# Patient Record
(demographics unavailable — no encounter records)

---

## 2024-10-22 NOTE — ASSESSMENT
[Patient Optimized for Surgery] : Patient optimized for surgery [No Further Testing Recommended] : no further testing recommended [Modify medications prior to procedure] : Modify medications prior to procedure [FreeTextEntry4] : Patient is medically cleared for surgery.  [FreeTextEntry7] : STOP Ozempic 7-10 days prior to procedure. Hold Metformin and Qsymia the morning of procedure.

## 2024-10-22 NOTE — HISTORY OF PRESENT ILLNESS
[No Pertinent Cardiac History] : no history of aortic stenosis, atrial fibrillation, coronary artery disease, recent myocardial infarction, or implantable device/pacemaker [No Pertinent Pulmonary History] : no history of asthma, COPD, sleep apnea, or smoking [No Adverse Anesthesia Reaction] : no adverse anesthesia reaction in self or family member [Chronic Anticoagulation] : no chronic anticoagulation [Chronic Kidney Disease] : no chronic kidney disease [Diabetes] : diabetes [(Patient denies any chest pain, claudication, dyspnea on exertion, orthopnea, palpitations or syncope)] : Patient denies any chest pain, claudication, dyspnea on exertion, orthopnea, palpitations or syncope [FreeTextEntry1] : varicose vein  [FreeTextEntry2] : 10/25/2024 [FreeTextEntry3] : Dr. Aliyah Neri  [FreeTextEntry4] : BETH SHAH is a 56 year female who presents today Oct 22, 2024 for medical clearance.

## 2024-10-22 NOTE — PHYSICAL EXAM
[Normal Oropharynx] : the oropharynx was normal [Normal] : normal rate, regular rhythm, normal S1 and S2 and no murmur heard [Coordination Grossly Intact] : coordination grossly intact [No Focal Deficits] : no focal deficits [Normal Gait] : normal gait [Normal Affect] : the affect was normal [Alert and Oriented x3] : oriented to person, place, and time [Normal Insight/Judgement] : insight and judgment were intact

## 2024-11-08 NOTE — ASSESSMENT
[FreeTextEntry1] : mood do   divorce finalized  .  she is doing very well . she is no longer in therapy . she has great coping skills and a very good support system. It has been a year since he left and she has gone through the "firsts" of everything.    cont  lexapro 10 mg qd 90 through the holidays. consider weaning in spring  fu 3 m  breast screening mammogram  breast us  refused flu shot

## 2024-11-08 NOTE — HEALTH RISK ASSESSMENT
[No] : No [1 or 2 (0 pts)] : 1 or 2 (0 points) [Never (0 pts)] : Never (0 points) [Little interest or pleasure doing things] : 1) Little interest or pleasure doing things [Feeling down, depressed, or hopeless] : 2) Feeling down, depressed, or hopeless [2] : 2) Feeling down, depressed, or hopeless for more than half of the days (2) [PHQ-2 Positive] : PHQ-2 Positive [Nearly Every Day (3)] : 5.) Poor appetite or overeating? Nearly every day [1/2 of Days or More (2)] : 7.) Trouble concentrating on things, such as reading a newspaper or watching television? Half the days or more [Not at All (0)] : 9.) Thoughts that you would be off dead or of hurting yourself in some way? Not at all [Moderately Severe] : Severity of Depression is Moderately Severe [Somewhat Difficult] : How difficult have these problems made it for you to do your work, take care of things at home, or get along with people? Somewhat difficult [PHQ-9 Positive] : PHQ-9 Positive [Never] : Never [[id number]] : 4 [[id number]] : 16

## 2024-11-08 NOTE — PHYSICAL EXAM
[No Acute Distress] : no acute distress [Normal] : no respiratory distress, lungs were clear to auscultation bilaterally and no accessory muscle use [de-identified] : legs bruised and small wound left calf from vein surgery [de-identified] : pt did not cry  she is very vocal about what happened

## 2024-11-08 NOTE — REVIEW OF SYSTEMS
[Anxiety] : anxiety [Fatigue] : fatigue [Recent Change In Weight] : ~T recent weight change [Depression] : no depression [Negative] : Gastrointestinal [FreeTextEntry5] : veins are healing
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
## 2025-04-09 NOTE — REVIEW OF SYSTEMS
[Nasal Discharge] : nasal discharge [Postnasal Drip] : postnasal drip [Negative] : Respiratory [Earache] : no earache [Hearing Loss] : no hearing loss [Sore Throat] : no sore throat

## 2025-04-09 NOTE — HISTORY OF PRESENT ILLNESS
[FreeTextEntry8] : 57 yo wf hx of iddm hld undifferentiated connective tissue disease Pt presents with ongoing head congestion, sneezing, stuffiness, went to walk-in 2 weeks ago and doesn't feel fully recovered, states no relief with amoxicillin 10 d I took allergy pill it was still congested

## 2025-04-09 NOTE — ASSESSMENT
[FreeTextEntry1] : sinus Take antibiotics       omnicef 300 mg bid 20  medrol dose pask uad           ,  rest,  increase fluids, wash hands to prevent the spread of  infection . Take mucinex   over the counter. Take tylenol or advil for fever or body aches. Follow up if no better or worse respiratory symptoms.

## 2025-04-09 NOTE — PHYSICAL EXAM
[Ill-Appearing] : ill-appearing [Normal] : normal rate, regular rhythm, normal S1 and S2 and no murmur heard [de-identified] :  nasal passages erythema and purulent discharge, frontal sinuses are tender bilaterally. postnasal drip.

## 2025-05-05 NOTE — PHYSICAL EXAM
[No Acute Distress] : no acute distress [Normal] : no respiratory distress, lungs were clear to auscultation bilaterally and no accessory muscle use [de-identified] : sinus inflammed [de-identified] : legs bruised and small wound left calf from vein surgery [de-identified] : pt did not cry  she is very vocal about what happened

## 2025-05-05 NOTE — ASSESSMENT
[FreeTextEntry1] : mood do   divorce not  finalized  .  she is doing very well . she is no longer in therapy . she has great coping skills and a very good support system. It has been a year since he left and she has gone through the "firsts" of everything.    cont  lexapro 10 mg qd 90 through the summer consider weaning end of summer  fu 3 m allergies ipratropium bromide .06 2 sp q nos bid  clarinex 5 mg qd 30 or allegra otc  insomnia l - tryptophan  sleep hygiene reviewed
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
## 2025-05-05 NOTE — REVIEW OF SYSTEMS
[Fatigue] : fatigue [Recent Change In Weight] : ~T recent weight change [Anxiety] : anxiety [Negative] : Gastrointestinal [Nasal Discharge] : nasal discharge [Postnasal Drip] : postnasal drip [Depression] : no depression

## 2025-05-05 NOTE — HEALTH RISK ASSESSMENT
[No] : No [1 or 2 (0 pts)] : 1 or 2 (0 points) [Never (0 pts)] : Never (0 points) [Little interest or pleasure doing things] : 1) Little interest or pleasure doing things [Feeling down, depressed, or hopeless] : 2) Feeling down, depressed, or hopeless [2] : 2) Feeling down, depressed, or hopeless for more than half of the days (2) [PHQ-2 Positive] : PHQ-2 Positive [Nearly Every Day (3)] : 5.) Poor appetite or overeating? Nearly every day [1/2 of Days or More (2)] : 7.) Trouble concentrating on things, such as reading a newspaper or watching television? Half the days or more [Not at All (0)] : 9.) Thoughts that you would be off dead or of hurting yourself in some way? Not at all [Moderately Severe] : Severity of Depression is Moderately Severe [Somewhat Difficult] : How difficult have these problems made it for you to do your work, take care of things at home, or get along with people? Somewhat difficult [PHQ-9 Positive] : PHQ-9 Positive [Never] : Never [[id number]] : 4 [[id number]] : 16

## 2025-06-24 NOTE — PLAN
[FreeTextEntry1] : Patient presents for rhinitis and neck pain  #rhinitis Likely allergic rhinitis. On going for about 6 months and occurs intermittently. Will trial Fluticasone 50 mcg/act 1 spray BID and Azelastine 137 mcg/act 2 sprays BID If no improvement, may see ENT and Allergist  #left sided Neck pain likely MSK If worsening, advised getting u/s

## 2025-06-24 NOTE — REVIEW OF SYSTEMS
[Earache] : earache [Nasal Discharge] : nasal discharge [Negative] : Heme/Lymph [Postnasal Drip] : no postnasal drip [Shortness Of Breath] : no shortness of breath [Cough] : no cough

## 2025-06-24 NOTE — PHYSICAL EXAM
[No Acute Distress] : no acute distress [Well Nourished] : well nourished [Well Developed] : well developed [Well-Appearing] : well-appearing [Normal Sclera/Conjunctiva] : normal sclera/conjunctiva [Normal Outer Ear/Nose] : the outer ears and nose were normal in appearance [Normal Oropharynx] : the oropharynx was normal [Normal TMs] : both tympanic membranes were normal [No Respiratory Distress] : no respiratory distress  [No Accessory Muscle Use] : no accessory muscle use [Clear to Auscultation] : lungs were clear to auscultation bilaterally [Normal Rate] : normal rate  [Regular Rhythm] : with a regular rhythm [Normal S1, S2] : normal S1 and S2 [No Murmur] : no murmur heard [No Edema] : there was no peripheral edema [Normal Gait] : normal gait [Normal Affect] : the affect was normal [Normal Insight/Judgement] : insight and judgment were intact [de-identified] : erythema in the nasal mucosa [de-identified] : mild right lymph node enlargement

## 2025-06-24 NOTE — HISTORY OF PRESENT ILLNESS
[FreeTextEntry8] : Patient presents for sinus issues for the past 6months.  Thought she had allergies.  Went to an urgent and got abx.  Then came here and got allergy pills, nasal sprays, and prednisone.  Still having these symptoms intermittently.  Also having difficulty breathing through her nose.  Her ears also hurt on and off.   Tried afrin and had temporaily resovled. Also with left neck pain which is improving.